# Patient Record
(demographics unavailable — no encounter records)

---

## 2025-03-14 NOTE — PHYSICAL EXAM
[5___] : eversion 5[unfilled]/5 [2+] : posterior tibialis pulse: 2+ [Normal] : saphenous nerve sensation normal [Right] : right ankle [There are no fractures, subluxations or dislocations. No significant abnormalities are seen] : There are no fractures, subluxations or dislocations. No significant abnormalities are seen [] : mildly antalgic [FreeTextEntry9] :  AP, mortise and lateral xrays of the ankle were taken and reviewed today. [TWNoteComboBox7] : dorsiflexion 10 degrees [de-identified] : plantar flexion 30 degrees [de-identified] : inversion 15 degrees [de-identified] : eversion 10 degrees

## 2025-03-14 NOTE — DISCUSSION/SUMMARY
[de-identified] : The patient was explained that they have an ankle sprain. Weight bearing in a supportive brace was recommended. NSAIDS prn.  Icing for 20 minutes 2-3 times per day was instructed. Physical therapy was prescribed, and home range of motion exercises were encouraged.

## 2025-03-14 NOTE — HISTORY OF PRESENT ILLNESS
[Sports related] : sports related [2] : 2 [6] : 6 [Dull/Aching] : dull/aching [de-identified] : DANIELLE GORDON a 15 year old male here for evaluation of his right ankle. Reports collision while playing soccer on 3/13/25, injuring his right ankle. He has been WB in sneakers, using ace bandage support. No formal treatment to date. No previous injury/problem with right ankle.   [FreeTextEntry1] : rt ankle  [FreeTextEntry3] : 3/13/25 [FreeTextEntry5] : pt collided with another player and fell and rolled his ankle